# Patient Record
Sex: FEMALE | URBAN - METROPOLITAN AREA
[De-identification: names, ages, dates, MRNs, and addresses within clinical notes are randomized per-mention and may not be internally consistent; named-entity substitution may affect disease eponyms.]

---

## 2019-07-29 ENCOUNTER — APPOINTMENT (RX ONLY)
Dept: URBAN - METROPOLITAN AREA CLINIC 172 | Facility: CLINIC | Age: 49
Setting detail: DERMATOLOGY
End: 2019-07-29

## 2019-07-29 DIAGNOSIS — L40.0 PSORIASIS VULGARIS: ICD-10-CM

## 2019-07-29 PROBLEM — L30.9 DERMATITIS, UNSPECIFIED: Status: ACTIVE | Noted: 2019-07-29

## 2019-07-29 PROCEDURE — ? BIOPSY BY PUNCH METHOD

## 2019-07-29 PROCEDURE — ? PATIENT SPECIFIC COUNSELING

## 2019-07-29 PROCEDURE — 11104 PUNCH BX SKIN SINGLE LESION: CPT

## 2019-07-29 ASSESSMENT — LOCATION DETAILED DESCRIPTION DERM
LOCATION DETAILED: RIGHT ANKLE
LOCATION DETAILED: LEFT ANKLE
LOCATION DETAILED: LEFT DORSAL FOOT
LOCATION DETAILED: RIGHT DORSAL FOOT

## 2019-07-29 ASSESSMENT — LOCATION SIMPLE DESCRIPTION DERM
LOCATION SIMPLE: LEFT FOOT
LOCATION SIMPLE: RIGHT ANKLE
LOCATION SIMPLE: LEFT ANKLE
LOCATION SIMPLE: RIGHT FOOT

## 2019-07-29 ASSESSMENT — LOCATION ZONE DERM
LOCATION ZONE: FEET
LOCATION ZONE: LEG

## 2019-07-29 NOTE — PROCEDURE: PATIENT SPECIFIC COUNSELING
Paint denies joints pains. Patient understands we will call her with biopsy results and prescribe topical.
Detail Level: Zone

## 2019-07-29 NOTE — PROCEDURE: BIOPSY BY PUNCH METHOD
Lab Facility: [id number]
Anesthesia Type: 1% lidocaine with 1:100,000 epinephrine and a 1:10 solution of 8.4% sodium bicarbonate
Home Suture Removal Text: Patient was provided a home suture removal kit and will remove their sutures at home.  If they have any questions or difficulties they will call the office.
Size Of Lesion In Cm (Optional): 0
Notification Instructions: Patient will be notified of biopsy results. However, patient instructed to call the office if not contacted within 2 weeks.
Consent: Written consent was obtained and risks were reviewed including but not limited to scarring, infection, bleeding, scabbing, incomplete removal, nerve damage and allergy to anesthesia.
Suture Removal: 14 days
Bill [id number] For Specimen Handling/Conveyance To Laboratory?: no
Wound Care: No ointment
Detail Level: Detailed
Punch Size In Mm: 3.5
Billing Type: Third-Party Bill
Anesthesia Volume In Cc (Will Not Render If 0): 1.5
Epidermal Sutures: 3-0 Ethilon
Lab: [id number]
Hemostasis: Pressure
Was A Bandage Applied: Yes
Dressing: dry sterile dressing
Biopsy Type: H and E
Post-Care Instructions: I reviewed with the patient in detail post-care instructions. Patient is to keep the biopsy site dry overnight, and then apply bacitracin twice daily until healed. Patient may apply hydrogen peroxide soaks to remove any crusting.

## 2019-08-05 ENCOUNTER — APPOINTMENT (RX ONLY)
Dept: URBAN - METROPOLITAN AREA CLINIC 172 | Facility: CLINIC | Age: 49
Setting detail: DERMATOLOGY
End: 2019-08-05

## 2019-08-05 DIAGNOSIS — Z48.02 ENCOUNTER FOR REMOVAL OF SUTURES: ICD-10-CM

## 2019-08-05 PROCEDURE — ? SUTURE REMOVAL (NO GLOBAL PERIOD)

## 2019-08-05 ASSESSMENT — LOCATION SIMPLE DESCRIPTION DERM: LOCATION SIMPLE: LEFT ANKLE

## 2019-08-05 ASSESSMENT — LOCATION DETAILED DESCRIPTION DERM: LOCATION DETAILED: LEFT ANKLE

## 2019-08-05 ASSESSMENT — LOCATION ZONE DERM: LOCATION ZONE: LEG

## 2019-08-07 ENCOUNTER — RX ONLY (OUTPATIENT)
Age: 49
Setting detail: RX ONLY
End: 2019-08-07

## 2019-08-07 RX ORDER — CLOBETASOL PROPIONATE 0.5 MG/G
1 OINTMENT TOPICAL 1
Qty: 1 | Refills: 1 | Status: ERX | COMMUNITY
Start: 2019-08-07

## 2023-02-22 ENCOUNTER — OFFICE VISIT (OUTPATIENT)
Dept: URBAN - METROPOLITAN AREA CLINIC 52 | Facility: CLINIC | Age: 53
End: 2023-02-22
Payer: COMMERCIAL

## 2023-02-22 DIAGNOSIS — H25.21 AGE-RELATED CATARACT, MORGANIAN TYPE, RIGHT EYE: Primary | ICD-10-CM

## 2023-02-22 DIAGNOSIS — H25.812 COMBINED FORMS OF AGE-RELATED CATARACT, LEFT EYE: ICD-10-CM

## 2023-02-22 PROCEDURE — 99204 OFFICE O/P NEW MOD 45 MIN: CPT | Performed by: OPTOMETRIST

## 2023-02-22 ASSESSMENT — KERATOMETRY
OS: 42.88
OD: 43.50

## 2023-02-22 ASSESSMENT — INTRAOCULAR PRESSURE
OS: 18
OD: 20

## 2023-02-22 NOTE — IMPRESSION/PLAN
Impression: Age-related cataract, morganian type, right eye: H25.21. Plan: Patient has white cataract OD and visually significant cataract OS. Patient denies Hx of systemic condition, denies Dx of diabetes. States vision has worsened quickly over past 3 weeks. Given decreased visual acuity and rate of progression recommend patient have cataract extraction OD then OS on urgent basis to limit functional loss. Patient needs B-scan prior to cataract extraction OD, discussed this with patient and that any presence of retinal problems could limit visual outcome after cataract extraction, patient demonstrated understanding. Patient does not have a PCP and has not seen a PCP for at least 30 years. Ordered baseline bloodwork with glucose panel and A1C.

## 2023-02-23 ENCOUNTER — OFFICE VISIT (OUTPATIENT)
Dept: URBAN - METROPOLITAN AREA CLINIC 13 | Facility: CLINIC | Age: 53
End: 2023-02-23
Payer: COMMERCIAL

## 2023-02-23 DIAGNOSIS — H25.811 COMBINED FORMS OF AGE-RELATED CATARACT, RIGHT EYE: Primary | ICD-10-CM

## 2023-02-23 PROCEDURE — 92134 CPTRZ OPH DX IMG PST SGM RTA: CPT | Performed by: STUDENT IN AN ORGANIZED HEALTH CARE EDUCATION/TRAINING PROGRAM

## 2023-02-23 PROCEDURE — 99203 OFFICE O/P NEW LOW 30 MIN: CPT | Performed by: STUDENT IN AN ORGANIZED HEALTH CARE EDUCATION/TRAINING PROGRAM

## 2023-02-23 PROCEDURE — 76512 OPH US DX B-SCAN: CPT | Performed by: STUDENT IN AN ORGANIZED HEALTH CARE EDUCATION/TRAINING PROGRAM

## 2023-02-23 ASSESSMENT — INTRAOCULAR PRESSURE
OD: 12
OS: 12

## 2023-02-23 NOTE — IMPRESSION/PLAN
Impression: Combined forms of age-related cataract, right eye: H25.811. OCT: wnl OS, no view OD Plan: -Preformed B-scan which showed no retinal abnormalities
-Pt cleared for Cat SX from retina perspective RTC: PRN

## 2023-02-27 ENCOUNTER — OFFICE VISIT (OUTPATIENT)
Dept: URBAN - METROPOLITAN AREA CLINIC 52 | Facility: CLINIC | Age: 53
End: 2023-02-27
Payer: COMMERCIAL

## 2023-02-27 DIAGNOSIS — H25.13 AGE-RELATED NUCLEAR CATARACT, BILATERAL: Primary | ICD-10-CM

## 2023-02-27 PROCEDURE — 99214 OFFICE O/P EST MOD 30 MIN: CPT | Performed by: OPHTHALMOLOGY

## 2023-02-27 ASSESSMENT — INTRAOCULAR PRESSURE
OS: 22
OD: 17
OS: 17
OD: 22

## 2023-02-27 NOTE — IMPRESSION/PLAN
Impression: Age-related nuclear cataract, bilateral: H25.13. Plan: Ok for CEIOL OD in [address], [address]. AIM FAR. Discussed lens options, Standard. Discussed need for glasses for near vision with standard and Toric as well. Discussed diagnosis of cataracts. Discussed due to poor view OD may be pathology that is hidden by cataract. Cataracts are currently limiting vision. Discussed R/B/A to surgery including but not limited to: bleeding, infection, risk of vision loss, loss of the eye, need for other surgery. Patient voiced understanding and wishes to proceed. Discussed using drops after sx. Pred TID for 1 week then BID for 1 week then QD for 1 week then D/C. Also Prolensa QD for 3 weeks then D/C.

## 2023-04-11 ENCOUNTER — SURGERY (OUTPATIENT)
Dept: URBAN - METROPOLITAN AREA SURGERY 29 | Facility: LOCATION | Age: 53
End: 2023-04-11
Payer: COMMERCIAL

## 2023-04-11 ENCOUNTER — SURGERY (OUTPATIENT)
Dept: URBAN - METROPOLITAN AREA SURGERY 28 | Facility: LOCATION | Age: 53
End: 2023-04-11
Payer: COMMERCIAL

## 2023-04-11 DIAGNOSIS — H25.89 OTHER AGE-RELATED CATARACT: Primary | ICD-10-CM

## 2023-04-11 PROCEDURE — 66982 XCAPSL CTRC RMVL CPLX WO ECP: CPT | Performed by: OPHTHALMOLOGY

## 2023-04-11 PROCEDURE — PR1CP PR1CP: CUSTOM | Performed by: OPHTHALMOLOGY

## 2023-04-12 ENCOUNTER — POST-OPERATIVE VISIT (OUTPATIENT)
Dept: URBAN - METROPOLITAN AREA CLINIC 52 | Facility: CLINIC | Age: 53
End: 2023-04-12
Payer: COMMERCIAL

## 2023-04-12 DIAGNOSIS — Z48.810 ENCOUNTER FOR SURGICAL AFTERCARE FOLLOWING SURGERY ON A SENSE ORGAN: Primary | ICD-10-CM

## 2023-04-12 PROCEDURE — 99024 POSTOP FOLLOW-UP VISIT: CPT | Performed by: OPTOMETRIST

## 2023-04-12 ASSESSMENT — INTRAOCULAR PRESSURE: OD: 22

## 2023-04-12 NOTE — IMPRESSION/PLAN
Impression: S/P Cataract Extraction by phacoemulsification with IOL placement; ORA; ERX OD - 1 Day. Encounter for surgical aftercare following surgery on a sense organ  Z48.810. Plan: Discussed findings with patient and re-educated on PO instructions. Instructed patient to call if any significant pain, loss of vision, flashes or new floaters.

## 2023-04-19 ENCOUNTER — POST-OPERATIVE VISIT (OUTPATIENT)
Dept: URBAN - METROPOLITAN AREA CLINIC 52 | Facility: CLINIC | Age: 53
End: 2023-04-19
Payer: COMMERCIAL

## 2023-04-19 DIAGNOSIS — Z48.810 ENCOUNTER FOR SURGICAL AFTERCARE FOLLOWING SURGERY ON A SENSE ORGAN: Primary | ICD-10-CM

## 2023-04-19 PROCEDURE — 99024 POSTOP FOLLOW-UP VISIT: CPT | Performed by: OPTOMETRIST

## 2023-04-19 ASSESSMENT — INTRAOCULAR PRESSURE
OS: 20
OD: 21

## 2023-04-19 ASSESSMENT — VISUAL ACUITY: OD: 20/20

## 2023-04-19 NOTE — IMPRESSION/PLAN
Impression: S/P Cataract Extraction by phacoemulsification with IOL placement; ORA; ERX OD - 8 Days. Encounter for surgical aftercare following surgery on a sense organ  Z48.810. Plan: Discussed findings, healing well. Patient to call if any sudden vision changes, pain, flashes of light, new floaters, or curtain/veil of vision loss. RTC 3wk for PO3 with Dr. Marifer Castanon. **Patient has new cortical changes OS that have grown rapidly, patient expresses blocked vision OS. Will setup 1mo PO with Dr. Marifer Castanon to evaluate OS cataract.

## 2024-02-01 ENCOUNTER — OFFICE VISIT (OUTPATIENT)
Dept: URBAN - METROPOLITAN AREA CLINIC 52 | Facility: CLINIC | Age: 54
End: 2024-02-01
Payer: COMMERCIAL

## 2024-02-01 DIAGNOSIS — H04.123 DRY EYE SYNDROME OF BILATERAL LACRIMAL GLANDS: ICD-10-CM

## 2024-02-01 DIAGNOSIS — H25.812 COMBINED FORMS OF AGE-RELATED CATARACT, LEFT EYE: Primary | ICD-10-CM

## 2024-02-01 PROCEDURE — 92134 CPTRZ OPH DX IMG PST SGM RTA: CPT | Performed by: OPHTHALMOLOGY

## 2024-02-01 PROCEDURE — 99214 OFFICE O/P EST MOD 30 MIN: CPT | Performed by: OPHTHALMOLOGY

## 2024-02-01 ASSESSMENT — KERATOMETRY
OS: 42.88
OD: 43.00

## 2024-02-01 ASSESSMENT — INTRAOCULAR PRESSURE
OS: 21
OD: 13
OS: 11
OD: 20

## 2024-02-01 ASSESSMENT — VISUAL ACUITY
OD: 20/20
OS: 20/25

## 2024-03-11 ENCOUNTER — OFFICE VISIT (OUTPATIENT)
Dept: URBAN - METROPOLITAN AREA CLINIC 52 | Facility: CLINIC | Age: 54
End: 2024-03-11
Payer: COMMERCIAL

## 2024-03-11 DIAGNOSIS — H25.812 COMBINED FORMS OF AGE-RELATED CATARACT, LEFT EYE: Primary | ICD-10-CM

## 2024-03-11 DIAGNOSIS — H40.053 OCULAR HYPERTENSION, BILATERAL: ICD-10-CM

## 2024-03-11 RX ORDER — PREDNISOLONE ACETATE 10 MG/ML
1 % SUSPENSION/ DROPS OPHTHALMIC
Qty: 10 | Refills: 1 | Status: ACTIVE
Start: 2024-03-11

## 2024-03-11 RX ORDER — BROMFENAC SODIUM 0.7 MG/ML
0.07 % SOLUTION/ DROPS OPHTHALMIC
Qty: 5 | Refills: 1 | Status: ACTIVE
Start: 2024-03-11

## 2024-03-11 ASSESSMENT — INTRAOCULAR PRESSURE
OS: 23
OD: 20
OD: 23
OS: 20

## 2024-03-11 ASSESSMENT — PACHYMETRY
OS: 23.79
OD: 23.79
OD: 4.48
OS: 3.24

## 2024-03-22 ENCOUNTER — POST-OPERATIVE VISIT (OUTPATIENT)
Dept: URBAN - METROPOLITAN AREA CLINIC 45 | Facility: CLINIC | Age: 54
End: 2024-03-22
Payer: COMMERCIAL

## 2024-03-22 DIAGNOSIS — Z96.1 PRESENCE OF INTRAOCULAR LENS: Primary | ICD-10-CM

## 2024-03-22 ASSESSMENT — INTRAOCULAR PRESSURE
OS: 25
OD: 19

## 2024-03-27 ENCOUNTER — POST-OPERATIVE VISIT (OUTPATIENT)
Dept: URBAN - METROPOLITAN AREA CLINIC 52 | Facility: CLINIC | Age: 54
End: 2024-03-27
Payer: COMMERCIAL

## 2024-03-27 DIAGNOSIS — Z96.1 PRESENCE OF INTRAOCULAR LENS: Primary | ICD-10-CM

## 2024-03-27 ASSESSMENT — VISUAL ACUITY: OS: 20/20

## 2024-03-27 ASSESSMENT — INTRAOCULAR PRESSURE
OS: 20
OD: 20

## 2024-04-24 ENCOUNTER — POST-OPERATIVE VISIT (OUTPATIENT)
Dept: URBAN - METROPOLITAN AREA CLINIC 52 | Facility: CLINIC | Age: 54
End: 2024-04-24
Payer: COMMERCIAL

## 2024-04-24 DIAGNOSIS — Z96.1 PRESENCE OF INTRAOCULAR LENS: Primary | ICD-10-CM

## 2024-04-24 ASSESSMENT — INTRAOCULAR PRESSURE
OD: 22
OS: 20

## 2024-05-03 ENCOUNTER — POST-OPERATIVE VISIT (OUTPATIENT)
Dept: URBAN - METROPOLITAN AREA CLINIC 52 | Facility: CLINIC | Age: 54
End: 2024-05-03
Payer: COMMERCIAL

## 2024-05-03 DIAGNOSIS — Z96.1 PRESENCE OF INTRAOCULAR LENS: Primary | ICD-10-CM

## 2024-05-03 ASSESSMENT — INTRAOCULAR PRESSURE
OD: 21
OS: 21

## 2024-05-03 ASSESSMENT — VISUAL ACUITY
OS: 20/25
OD: 20/25